# Patient Record
Sex: MALE | Race: OTHER | Employment: FULL TIME
[De-identification: names, ages, dates, MRNs, and addresses within clinical notes are randomized per-mention and may not be internally consistent; named-entity substitution may affect disease eponyms.]

---

## 2024-11-21 ENCOUNTER — HOSPITAL ENCOUNTER (EMERGENCY)
Facility: HOSPITAL | Age: 29
Discharge: HOME | End: 2024-11-21
Attending: EMERGENCY MEDICINE

## 2024-11-21 VITALS
SYSTOLIC BLOOD PRESSURE: 131 MMHG | OXYGEN SATURATION: 100 % | DIASTOLIC BLOOD PRESSURE: 84 MMHG | HEART RATE: 92 BPM | WEIGHT: 170 LBS | BODY MASS INDEX: 27.32 KG/M2 | TEMPERATURE: 97.8 F | HEIGHT: 66 IN | RESPIRATION RATE: 20 BRPM

## 2024-11-21 DIAGNOSIS — B07.0 PLANTAR WARTS: Primary | ICD-10-CM

## 2024-11-21 PROCEDURE — 99283 EMERGENCY DEPT VISIT LOW MDM: CPT

## 2024-11-21 ASSESSMENT — ENCOUNTER SYMPTOMS
COUGH: 0
VOMITING: 0
ABDOMINAL PAIN: 0
NAUSEA: 0
SHORTNESS OF BREATH: 0

## 2024-11-22 NOTE — ED PROVIDER NOTES
Management Options  Plantar warts  Diagnosis management comments: Plantar wart  No evidence of cellulitis or infection  Minimally tender to palpation full range of motion of the foot    Salicylate gel sent to the pharmacy  Podiatry follow-up for further management  Patient education given             Patient was given the following medications:  Medications - No data to display    CONSULTS: (Who and What was discussed)  None    Chronic Conditions: n/a    Social Determinants affecting Dx or Tx: None    Records Reviewed (source and summary of external notes): Nursing Notes    Procedures    Critical Care Time: 0    SCREENING TOOLS:  None    CLINICAL MANAGEMENT TOOLS:  Not Applicable    Is this patient to be included in the SEP-1 core measure? No Exclusion criteria - the patient is NOT to be included for SEP-1 Core Measure due to: 2+ SIRS criteria are not met         Diagnosis     Clinical Impression:   1. Plantar warts        Disposition: home    [facility] EMERGENCY DEPT  [address] [facility] [address] [phone]    If symptoms worsen, As needed    [facility]  [address] [place] [id number] [phone]  Schedule an appointment as soon as possible for a visit          Disclaimer: Sections of this note are dictated using utilizing voice recognition software.  Minor typographical errors may be present. If questions arise, please do not hesitate to contact me or call our department.        Carlitos Patel MD  11/21/24 2029